# Patient Record
Sex: MALE | Race: WHITE | NOT HISPANIC OR LATINO | URBAN - METROPOLITAN AREA
[De-identification: names, ages, dates, MRNs, and addresses within clinical notes are randomized per-mention and may not be internally consistent; named-entity substitution may affect disease eponyms.]

---

## 2018-08-09 ENCOUNTER — OFFICE VISIT (OUTPATIENT)
Dept: URBAN - METROPOLITAN AREA CLINIC 76 | Facility: CLINIC | Age: 83
End: 2018-08-09
Payer: MEDICARE

## 2018-08-09 DIAGNOSIS — H26.491 OTHER SECONDARY CATARACT, RIGHT EYE: Primary | ICD-10-CM

## 2018-08-09 PROCEDURE — 99214 OFFICE O/P EST MOD 30 MIN: CPT | Performed by: OPTOMETRIST

## 2018-08-09 PROCEDURE — 99203 OFFICE O/P NEW LOW 30 MIN: CPT | Performed by: OPTOMETRIST

## 2018-08-09 ASSESSMENT — INTRAOCULAR PRESSURE
OS: 32
OD: 18

## 2018-08-09 NOTE — IMPRESSION/PLAN
Impression: Other secondary cataract, right eye: H26.491. OD. Plan: refer to Dr. Mimi Singh for yag consult od nxt avail.

## 2018-08-10 ENCOUNTER — OFFICE VISIT (OUTPATIENT)
Dept: URBAN - METROPOLITAN AREA CLINIC 76 | Facility: CLINIC | Age: 83
End: 2018-08-10
Payer: MEDICARE

## 2018-08-10 DIAGNOSIS — Z96.1 PRESENCE OF INTRAOCULAR LENS: ICD-10-CM

## 2018-08-10 PROCEDURE — 92014 COMPRE OPH EXAM EST PT 1/>: CPT | Performed by: OPHTHALMOLOGY

## 2018-08-10 ASSESSMENT — INTRAOCULAR PRESSURE
OS: 32
OD: 19

## 2018-08-10 NOTE — IMPRESSION/PLAN
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 Continue Latanoprost QHS OU Will hold off on VF and OCT for now.

## 2018-08-10 NOTE — IMPRESSION/PLAN
Impression: Diagnosis: Presence of intraocular lens. Code: Z96.1. Side: OD.  Plan: Continue to monitor

## 2018-08-10 NOTE — IMPRESSION/PLAN
Impression: Diagnosis: Central retinal vein occlusion, left eye, stable. Code: J80.9109.  Plan: Continue to monitor

## 2018-08-10 NOTE — IMPRESSION/PLAN
Impression: Diagnosis: Nonexudative age-related macular degeneration, right eye, advanced atrophic with subfoveal involvement. Code: Y49.7366. OCT shows no progression of ARMD. Plan: Will continue to observe condition and or symptoms. Use of vitamins may reduce progression of ARMD.  Discussed added risk and vision limitations.

## 2018-08-10 NOTE — IMPRESSION/PLAN
Impression: Other secondary cataract, right eye: H26.491. OD. Visually significant Plan: Discussed diagnosis in detail with patient. recommend Yag OD.  r/b/a of yag cap OD  discussed, pt would like to proceed.

## 2018-08-20 ENCOUNTER — SURGERY (OUTPATIENT)
Dept: URBAN - METROPOLITAN AREA SURGERY 47 | Facility: SURGERY | Age: 83
End: 2018-08-20
Payer: MEDICARE

## 2018-08-20 PROCEDURE — 66821 AFTER CATARACT LASER SURGERY: CPT | Performed by: OPHTHALMOLOGY

## 2018-08-28 ENCOUNTER — POST-OPERATIVE VISIT (OUTPATIENT)
Dept: URBAN - METROPOLITAN AREA CLINIC 76 | Facility: CLINIC | Age: 83
End: 2018-08-28
Payer: MEDICARE

## 2018-08-28 DIAGNOSIS — Z09 ENCNTR FOR F/U EXAM AFT TRTMT FOR COND OTH THAN MALIG NEOPLM: Primary | ICD-10-CM

## 2018-08-28 PROCEDURE — 99024 POSTOP FOLLOW-UP VISIT: CPT | Performed by: OPHTHALMOLOGY

## 2018-08-28 ASSESSMENT — VISUAL ACUITY
OD: CF5
OS: NLP

## 2018-08-28 ASSESSMENT — INTRAOCULAR PRESSURE
OS: 39
OD: 20

## 2019-01-01 ENCOUNTER — OFFICE VISIT (OUTPATIENT)
Dept: URBAN - METROPOLITAN AREA CLINIC 76 | Facility: CLINIC | Age: 84
End: 2019-01-01
Payer: MEDICARE

## 2019-01-01 DIAGNOSIS — H25.12 AGE-RELATED NUCLEAR CATARACT, LEFT EYE: ICD-10-CM

## 2019-01-01 PROCEDURE — 92134 CPTRZ OPH DX IMG PST SGM RTA: CPT | Performed by: OPHTHALMOLOGY

## 2019-01-01 PROCEDURE — 99214 OFFICE O/P EST MOD 30 MIN: CPT | Performed by: OPHTHALMOLOGY

## 2019-01-01 ASSESSMENT — KERATOMETRY
OD: 44.00
OS: 43.50

## 2019-01-01 ASSESSMENT — INTRAOCULAR PRESSURE
OD: 22
OS: 32

## 2019-03-05 ENCOUNTER — OFFICE VISIT (OUTPATIENT)
Dept: URBAN - METROPOLITAN AREA CLINIC 76 | Facility: CLINIC | Age: 84
End: 2019-03-05
Payer: MEDICARE

## 2019-03-05 DIAGNOSIS — H34.8122 CENTRAL RETINAL VEIN OCCLUSION, LEFT EYE, STABLE: ICD-10-CM

## 2019-03-05 DIAGNOSIS — H40.053 OCULAR HYPERTENSION, BILATERAL: Primary | ICD-10-CM

## 2019-03-05 DIAGNOSIS — H35.3114 NONEXUDATIVE AGE-RELATED MACULAR DEGENERATION, RIGHT EYE, ADVANCED ATROPHIC WITH SUBFOVEAL INVOLVEMENT: ICD-10-CM

## 2019-03-05 PROCEDURE — 92014 COMPRE OPH EXAM EST PT 1/>: CPT | Performed by: OPHTHALMOLOGY

## 2019-03-05 PROCEDURE — 76512 OPH US DX B-SCAN: CPT | Performed by: OPHTHALMOLOGY

## 2019-03-05 ASSESSMENT — INTRAOCULAR PRESSURE
OD: 19
OS: 35

## 2019-03-05 NOTE — IMPRESSION/PLAN
Impression: Diagnosis: Central retinal vein occlusion, left eye, stable. Code: D20.1300.  Plan: Continue to monitor

## 2019-03-05 NOTE — IMPRESSION/PLAN
Impression: Diagnosis: Nonexudative age-related macular degeneration, right eye, advanced atrophic with subfoveal involvement. Code: E98.3621. OCT shows no progression of ARMD. Plan: Will continue to observe condition and or symptoms. Use of vitamins may reduce progression of ARMD.  Discussed added risk and vision limitations.

## 2019-03-05 NOTE — IMPRESSION/PLAN
Impression: Age-related nuclear cataract, left eye: H25.12. OS. mature cataract Plan: Continue to monitor.

## 2019-09-06 NOTE — IMPRESSION/PLAN
Impression: Diagnosis: Central retinal vein occlusion, left eye, stable. Code: F47.9991.  Plan: Continue to monitor

## 2019-09-06 NOTE — IMPRESSION/PLAN
Impression: Diagnosis: Nonexudative age-related macular degeneration, right eye, advanced atrophic with subfoveal involvement. Code: R06.2744.   OCT shows no progression of ARMD. Plan: Continue to monitor

## 2019-09-06 NOTE — IMPRESSION/PLAN
Impression: Age-related nuclear cataract, left eye: H25.12. OS. mature cataract No view of posterior pole Plan: Continue to monitor.

## 2019-09-06 NOTE — IMPRESSION/PLAN
Duration Of Freeze Thaw-Cycle (Seconds): 0 Impression: Diagnosis: Presence of intraocular lens. Code: Z96.1. Side: OD.  Plan: Continue to monitor Render Post-Care Instructions In Note?: no Post-Care Instructions: I reviewed with the patient in detail post-care instructions. Patient is to wear sunprotection, and avoid picking at any of the treated lesions. Pt may apply Vaseline to crusted or scabbing areas. Consent: The patient's consent was obtained including but not limited to risks of crusting, scabbing, blistering, scarring, darker or lighter pigmentary change, recurrence, incomplete removal and infection. Detail Level: Detailed

## 2020-01-01 ENCOUNTER — OFFICE VISIT (OUTPATIENT)
Dept: URBAN - METROPOLITAN AREA CLINIC 76 | Facility: CLINIC | Age: 85
End: 2020-01-01
Payer: MEDICARE

## 2020-01-01 PROCEDURE — 92134 CPTRZ OPH DX IMG PST SGM RTA: CPT | Performed by: OPHTHALMOLOGY

## 2020-01-01 PROCEDURE — 92014 COMPRE OPH EXAM EST PT 1/>: CPT | Performed by: OPHTHALMOLOGY

## 2020-01-01 ASSESSMENT — INTRAOCULAR PRESSURE
OS: 31
OD: 21

## 2020-03-04 NOTE — IMPRESSION/PLAN
Impression: Diagnosis: Nonexudative age-related macular degeneration, right eye, advanced atrophic with subfoveal involvement. Code: D37.6873. OCT shows no progression of ARMD. Plan: Continue to monitor. Discussed with patient the option of yearly exams.

## 2020-03-04 NOTE — IMPRESSION/PLAN
Impression: Diagnosis: Central retinal vein occlusion, left eye, stable. Code: N15.8579.  Plan: Continue to monitor